# Patient Record
Sex: MALE | Race: WHITE | Employment: FULL TIME | URBAN - METROPOLITAN AREA
[De-identification: names, ages, dates, MRNs, and addresses within clinical notes are randomized per-mention and may not be internally consistent; named-entity substitution may affect disease eponyms.]

---

## 2017-05-11 ENCOUNTER — OFFICE VISIT (OUTPATIENT)
Dept: SLEEP MEDICINE | Age: 57
End: 2017-05-11

## 2017-05-11 VITALS
HEIGHT: 69 IN | DIASTOLIC BLOOD PRESSURE: 85 MMHG | OXYGEN SATURATION: 97 % | SYSTOLIC BLOOD PRESSURE: 143 MMHG | BODY MASS INDEX: 39.69 KG/M2 | HEART RATE: 73 BPM | WEIGHT: 268 LBS

## 2017-05-11 DIAGNOSIS — I10 ESSENTIAL HYPERTENSION: ICD-10-CM

## 2017-05-11 DIAGNOSIS — G47.33 OBSTRUCTIVE SLEEP APNEA (ADULT) (PEDIATRIC): Primary | ICD-10-CM

## 2017-05-11 NOTE — PROGRESS NOTES
217 [facility]., [place]. [place], [address]  Tel.  [phone]  Fax. 100 [facility] 60  [place], [address] [facility]  Tel.  [phone]  Fax. [phone] 3300 [facility] 3 [address]  Tel.  [phone]  Fax. [phone]     S>Gordon Bhatti is a 61 y.o. male seen for a positive airway pressure follow-up. He reports no problems using the device. The following problems are identified:    Drowsiness no Problems exhaling no   Snoring no Forget to put on no   Mask Comfortable yes Can't fall asleep no   Dry Mouth no Mask falls off no   Air Leaking no Frequent awakenings no     Download reviewed. He admits that his sleep has improved. Therapy Apnea Index averaged over PAP use: 1 /hr which reflects improved sleep breathing condition. No Known Allergies    He has a current medication list which includes the following prescription(s): naproxen sodium, citalopram, lisinopril, aspirin delayed-release, pravastatin, and docosahexanoic acid/epa. .      He  has a past medical history of Arthritis; Chronic pain; Hypertension; and Unspecified cerebral artery occlusion with cerebral infarction. Datto Sleepiness Score: 1   and Modified F.O.S.Q. Score Total / 2: 19.5   which reflect improved sleep quality over therapy time. O>    Visit Vitals    /85    Pulse 73    Ht 5' 9\" (1.753 m)    Wt 268 lb (121.6 kg)    SpO2 97%    BMI 39.58 kg/m2           General:   Alert, oriented, not in distress   Neck:   No JVD    Chest/Lungs:  symetrical lung expansion , no accessory muscle use    Extremities:  no obvious rashes , negative edema    Neuro:  No focal deficits ; No obvious tremor    Psych:  Normal affect ,  Normal countenance ;         A>    ICD-10-CM ICD-9-CM    1. Obstructive sleep apnea (adult) (pediatric) G47.33 327.23    2. Essential hypertension I10 401.9      AHI = 7(6-13). On CPAP :  5-15 cmH2O.     Compliant:      yes    Therapeutic Response:  Positive    P>      * Follow-up Disposition:  Return in about 1 year (around 5/11/2018). he will continue on his current pressure settings. I have ordered replacement supplies    I have counseled the patient regarding the benefits of weight loss. * He was asked to contact our office for any problems regarding PAP therapy. * Counseling was provided regarding the importance of regular PAP use and on proper sleep hygiene and safe driving. * Re-enforced proper and regular cleaning for the device. 2. Hypertension - he continues on his current regimen. I have reviewed the relationship between hypertension as it relates to sleep-disordered breathing.      Governor MD Carolann  Diplomate in Sleep Medicine  [facility]

## 2017-05-11 NOTE — PATIENT INSTRUCTIONS
217 [facility]., [place]. [place], [address]  Tel.  [phone]  Fax. 100 [facility] 60  [place], [address]  Tel.  [phone]  Fax. [phone] [address]  Tel.  [phone]  Fax. [phone]     PROPER SLEEP HYGIENE    What to avoid  · Do not have drinks with caffeine, such as coffee or black tea, for 8 hours before bed. · Do not smoke or use other types of tobacco near bedtime. Nicotine is a stimulant and can keep you awake. · Avoid drinking alcohol late in the evening, because it can cause you to wake in the middle of the night. · Do not eat a big meal close to bedtime. If you are hungry, eat a light snack. · Do not drink a lot of water close to bedtime, because the need to urinate may wake you up during the night. · Do not read or watch TV in bed. Use the bed only for sleeping and sexual activity. What to try  · Go to bed at the same time every night, and wake up at the same time every morning. Do not take naps during the day. · Keep your bedroom quiet, dark, and cool. · Get regular exercise, but not within 3 to 4 hours of your bedtime. .  · Sleep on a comfortable pillow and mattress. · If watching the clock makes you anxious, turn it facing away from you so you cannot see the time. · If you worry when you lie down, start a worry book. Well before bedtime, write down your worries, and then set the book and your concerns aside. · Try meditation or other relaxation techniques before you go to bed. · If you cannot fall asleep, get up and go to another room until you feel sleepy. Do something relaxing. Repeat your bedtime routine before you go to bed again. · Make your house quiet and calm about an hour before bedtime. Turn down the lights, turn off the TV, log off the computer, and turn down the volume on music. This can help you relax after a busy day.     Drowsy Driving  The [address] [place] [address] Road Traffic Safety Administration cites drowsiness as a causing factor in more than 202,339 police reported crashes annually, resulting in 76,000 injuries and 1,500 deaths. Other surveys suggest 55% of people polled have driven while drowsy in the past year, 23% had fallen asleep but not crashed, 3% crashed, and 2% had and accident due to drowsy driving. Who is at risk? Young Drivers: One study of drowsy driving accidents states that 55% of the drivers were under 25 years. Of those, 75% were male. Shift Workers and Travelers: People who work overnight or travel across time zones frequently are at higher risk of experiencing Circadian Rhythm Disorders. They are trying to work and function when their body is programed to sleep. Sleep Deprived: Lack of sleep has a serious impact on your ability to pay attention or focus on a task. Consistently getting less than the average of 8 hours your body needs creates partial or cumulative sleep deprivation. Untreated Sleep Disorders: Sleep Apnea, Narcolepsy, R.L.S., and other sleep disorders (untreated) prevent a person from getting enough restful sleep. This leads to excessive daytime sleepiness and increases the risk for drowsy driving accidents by up to 7 times. Medications / Alcohol: Even over the counter medications can cause drowsiness. Medications that impair a drivers attention should have a warning label. Alcohol naturally makes you sleepy and on its own can cause accidents. Combined with excessive drowsiness its effects are amplified. Signs of Drowsy Driving:   * You don't remember driving the last few miles   * You may drift out of your cherrie   * You are unable to focus and your thoughts wander   * You may yawn more often than normal   * You have difficulty keeping your eyes open / nodding off   * Missing traffic signs, speeding, or tailgating  Prevention-   Good sleep hygiene, lifestyle and behavioral choices have the most impact on drowsy driving.  There is no substitute for sleep and the average person requires 8 hours nightly. If you find yourself driving drowsy, stop and sleep. Consider the sleep hygiene tips provided during your visit as well. Medication Refill Policy: Refills for all medications require 1 week advance notice. Please have your pharmacy fax a refill request. We are unable to fax, or call in \"controled substance\" medications and you will need to pick these prescriptions up from our office. Blogvio Activation    Thank you for requesting access to Blogvio. Please follow the instructions below to securely access and download your online medical record. Blogvio allows you to send messages to your doctor, view your test results, renew your prescriptions, schedule appointments, and more. How Do I Sign Up? 1. In your internet browser, go to [email or web address]. 2. Click on the First Time User? Click Here link in the Sign In box. You will see the New Member Sign Up page. 3. Enter your Blogvio Access Code exactly as it appears below. You will not need to use this code after youve completed the sign-up process. If you do not sign up before the expiration date, you must request a new code. Blogvio Access Code: [email or web address]  Expires: 2017  9:03 AM (This is the date your Blogvio access code will )    4. Enter the last four digits of your Social Security Number (xxxx) and Date of Birth (mm/dd/yyyy) as indicated and click Submit. You will be taken to the next sign-up page. 5. Create a Blogvio ID. This will be your Blogvio login ID and cannot be changed, so think of one that is secure and easy to remember. 6. Create a Blogvio password. You can change your password at any time. 7. Enter your Password Reset Question and Answer. This can be used at a later time if you forget your password. 8. Enter your e-mail address. You will receive e-mail notification when new information is available in [address] 9. Click Sign Up.  You can now view and download portions of your medical record. 10. Click the Download Summary menu link to download a portable copy of your medical information. Additional Information    If you have questions, please call [phone]. Remember, [employer] is NOT to be used for urgent needs. For medical emergencies, dial 911.

## 2017-05-12 ENCOUNTER — DOCUMENTATION ONLY (OUTPATIENT)
Dept: SLEEP MEDICINE | Age: 57
End: 2017-05-12

## 2018-04-12 ENCOUNTER — DOCUMENTATION ONLY (OUTPATIENT)
Dept: SLEEP MEDICINE | Age: 58
End: 2018-04-12

## 2018-04-12 ENCOUNTER — OFFICE VISIT (OUTPATIENT)
Dept: SLEEP MEDICINE | Age: 58
End: 2018-04-12

## 2018-04-12 VITALS
OXYGEN SATURATION: 95 % | BODY MASS INDEX: 39.55 KG/M2 | HEART RATE: 65 BPM | SYSTOLIC BLOOD PRESSURE: 147 MMHG | WEIGHT: 267 LBS | DIASTOLIC BLOOD PRESSURE: 73 MMHG | HEIGHT: 69 IN

## 2018-04-12 DIAGNOSIS — I10 ESSENTIAL HYPERTENSION: ICD-10-CM

## 2018-04-12 DIAGNOSIS — E66.01 SEVERE OBESITY (BMI 35.0-39.9) WITH COMORBIDITY (HCC): ICD-10-CM

## 2018-04-12 DIAGNOSIS — G47.33 OBSTRUCTIVE SLEEP APNEA (ADULT) (PEDIATRIC): Primary | ICD-10-CM

## 2018-04-12 NOTE — PROGRESS NOTES
217 [facility]., [place]. [place], [address]  Tel.  [phone]  Fax. 100 [facility] 60  [place], [address]  Tel.  [phone]  Fax. [phone] [address]  Tel.  [phone]  Fax. [phone]     S>Gordon Desai is a 61 y.o. male seen for a positive airway pressure follow-up. He reports no problems using the device. The following problems are identified:    Drowsiness no Problems exhaling no   Snoring no Forget to put on no   Mask Comfortable yes Can't fall asleep no   Dry Mouth no Mask falls off no   Air Leaking no Frequent awakenings no       He admits that his sleep has improved. Therapy Apnea Index averaged over PAP use: 1 /hr which reflects improved sleep breathing condition. No Known Allergies    He has a current medication list which includes the following prescription(s): naproxen sodium, citalopram, lisinopril, aspirin delayed-release, pravastatin, and docosahexanoic acid/epa. .      He  has a past medical history of Arthritis; Chronic pain; Hypertension; and Unspecified cerebral artery occlusion with cerebral infarction. Trilla Sleepiness Score: 1   and Modified F.O.S.Q. Score Total / 2: 19.5   which reflect improved sleep quality over therapy time. O>    Visit Vitals    /73    Pulse 65    Ht 5' 9\" (1.753 m)    Wt 267 lb (121.1 kg)    SpO2 95%    BMI 39.43 kg/m2           General:   Alert, oriented, not in distress   Neck:   No JVD    Chest/Lungs:  symetrical lung expansion , no accessory muscle use    Extremities:  no obvious rashes , negative edema    Neuro:  No focal deficits ; No obvious tremor    Psych:  Normal affect ,  Normal countenance ;         A>    ICD-10-CM ICD-9-CM    1. Obstructive sleep apnea (adult) (pediatric) G47.33 327.23 AMB SUPPLY ORDER   2. Essential hypertension I10 401.9    3. Severe obesity (BMI 35.0-39. 9) with comorbidity ([facility] E66.01 278.01      AHI = 29(2009).   On CPAP : 5-15 cmH2O. Compliant:      yes    Therapeutic Response:  Positive    P>      * Follow-up Disposition:  Return in about 1 year (around 4/12/2019). he will continue on his current pressure settings. 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 * Counseling was provided regarding the importance of regular PAP use and on proper sleep hygiene and safe driving. * Re-enforced proper and regular cleaning for the device. 2. Hypertension - he continues on his current regimen. I have reviewed the relationship between hypertension as it relates to sleep-disordered breathing. 3. Obesity - I have counseled the patient regarding the benefits of weight loss.     Hamida Sevilla MD  Diplomate in Sleep Medicine  [facility]

## 2018-04-12 NOTE — PATIENT INSTRUCTIONS
217 [facility] [place]. [place], [address]  Tel.  [phone]  Fax. 100 [facility] 60  [place], [address]  Tel.  [phone]  Fax. [phone] [address]  Tel.  [phone]  Fax. [phone]     PROPER SLEEP HYGIENE    What to avoid  · Do not have drinks with caffeine, such as coffee or black tea, for 8 hours before bed. · Do not smoke or use other types of tobacco near bedtime. Nicotine is a stimulant and can keep you awake. · Avoid drinking alcohol late in the evening, because it can cause you to wake in the middle of the night. · Do not eat a big meal close to bedtime. If you are hungry, eat a light snack. · Do not drink a lot of water close to bedtime, because the need to urinate may wake you up during the night. · Do not read or watch TV in bed. Use the bed only for sleeping and sexual activity. What to try  · Go to bed at the same time every night, and wake up at the same time every morning. Do not take naps during the day. · Keep your bedroom quiet, dark, and cool. · Get regular exercise, but not within 3 to 4 hours of your bedtime. .  · Sleep on a comfortable pillow and mattress. · If watching the clock makes you anxious, turn it facing away from you so you cannot see the time. · If you worry when you lie down, start a worry book. Well before bedtime, write down your worries, and then set the book and your concerns aside. · Try meditation or other relaxation techniques before you go to bed. · If you cannot fall asleep, get up and go to another room until you feel sleepy. Do something relaxing. Repeat your bedtime routine before you go to bed again. · Make your house quiet and calm about an hour before bedtime. Turn down the lights, turn off the TV, log off the computer, and turn down the volume on music. This can help you relax after a busy day.     Drowsy Driving  The [address] [place] [address] Road Traffic Safety Administration cites drowsiness as a causing factor in more than 291,128 police reported crashes annually, resulting in 76,000 injuries and 1,500 deaths. Other surveys suggest 55% of people polled have driven while drowsy in the past year, 23% had fallen asleep but not crashed, 3% crashed, and 2% had and accident due to drowsy driving. Who is at risk? Young Drivers: One study of drowsy driving accidents states that 55% of the drivers were under 25 years. Of those, 75% were male. Shift Workers and Travelers: People who work overnight or travel across time zones frequently are at higher risk of experiencing Circadian Rhythm Disorders. They are trying to work and function when their body is programed to sleep. Sleep Deprived: Lack of sleep has a serious impact on your ability to pay attention or focus on a task. Consistently getting less than the average of 8 hours your body needs creates partial or cumulative sleep deprivation. Untreated Sleep Disorders: Sleep Apnea, Narcolepsy, R.L.S., and other sleep disorders (untreated) prevent a person from getting enough restful sleep. This leads to excessive daytime sleepiness and increases the risk for drowsy driving accidents by up to 7 times. Medications / Alcohol: Even over the counter medications can cause drowsiness. Medications that impair a drivers attention should have a warning label. Alcohol naturally makes you sleepy and on its own can cause accidents. Combined with excessive drowsiness its effects are amplified. Signs of Drowsy Driving:   * You don't remember driving the last few miles   * You may drift out of your cherrie   * You are unable to focus and your thoughts wander   * You may yawn more often than normal   * You have difficulty keeping your eyes open / nodding off   * Missing traffic signs, speeding, or tailgating  Prevention-   Good sleep hygiene, lifestyle and behavioral choices have the most impact on drowsy driving.  There is no substitute for sleep and the average person requires 8 hours nightly. If you find yourself driving drowsy, stop and sleep. Consider the sleep hygiene tips provided during your visit as well. Medication Refill Policy: Refills for all medications require 1 week advance notice. Please have your pharmacy fax a refill request. We are unable to fax, or call in \"controled substance\" medications and you will need to pick these prescriptions up from our office. discoapi Activation    Thank you for requesting access to discoapi. Please follow the instructions below to securely access and download your online medical record. discoapi allows you to send messages to your doctor, view your test results, renew your prescriptions, schedule appointments, and more. How Do I Sign Up? 1. In your internet browser, go to [email or web address] 2. Click on the First Time User? Click Here link in the Sign In box. You will see the New Member Sign Up page. 3. Enter your discoapi Access Code exactly as it appears below. You will not need to use this code after youve completed the sign-up process. If you do not sign up before the expiration date, you must request a new code. discoapi Access Code: [email or web address]  Expires: 2018  8:48 AM (This is the date your discoapi access code will )    4. Enter the last four digits of your Social Security Number (xxxx) and Date of Birth (mm/dd/yyyy) as indicated and click Submit. You will be taken to the next sign-up page. 5. Create a discoapi ID. This will be your discoapi login ID and cannot be changed, so think of one that is secure and easy to remember. 6. Create a discoapi password. You can change your password at any time. 7. Enter your Password Reset Question and Answer. This can be used at a later time if you forget your password. 8. Enter your e-mail address. You will receive e-mail notification when new information is available in [address] 9. Click Sign Up.  You can now view and download portions of your medical record. 10. Click the Download Summary menu link to download a portable copy of your medical information. Additional Information    If you have questions, please call [phone]. Remember, [employer] is NOT to be used for urgent needs. For medical emergencies, dial 911.

## 2019-10-09 ENCOUNTER — HOSPITAL ENCOUNTER (EMERGENCY)
Age: 59
Discharge: HOME | End: 2019-10-09
Attending: EMERGENCY MEDICINE | Admitting: EMERGENCY MEDICINE
Payer: COMMERCIAL

## 2019-10-09 ENCOUNTER — APPOINTMENT (OUTPATIENT)
Dept: GENERAL RADIOLOGY | Age: 59
End: 2019-10-09
Attending: EMERGENCY MEDICINE
Payer: COMMERCIAL

## 2019-10-09 VITALS
WEIGHT: 273.81 LBS | SYSTOLIC BLOOD PRESSURE: 154 MMHG | TEMPERATURE: 97.7 F | OXYGEN SATURATION: 96 % | DIASTOLIC BLOOD PRESSURE: 97 MMHG | BODY MASS INDEX: 40.56 KG/M2 | HEART RATE: 72 BPM | HEIGHT: 69 IN

## 2019-10-09 DIAGNOSIS — R07.9 CHEST PAIN, UNSPECIFIED TYPE: Primary | ICD-10-CM

## 2019-10-09 LAB
ALBUMIN SERPL-MCNC: 4.1 G/DL (ref 3.5–5)
ALBUMIN/GLOB SERPL: 1.1 {RATIO} (ref 1.1–2.2)
ALP SERPL-CCNC: 72 U/L (ref 45–117)
ALT SERPL-CCNC: 62 U/L (ref 12–78)
ANION GAP SERPL CALC-SCNC: 5 MMOL/L (ref 5–15)
AST SERPL-CCNC: 54 U/L (ref 15–37)
BASOPHILS # BLD: 0 K/UL (ref 0–0.1)
BASOPHILS NFR BLD: 1 % (ref 0–1)
BILIRUB SERPL-MCNC: 0.9 MG/DL (ref 0.2–1)
BUN SERPL-MCNC: 16 MG/DL (ref 6–20)
BUN/CREAT SERPL: 15 (ref 12–20)
CALCIUM SERPL-MCNC: 9.3 MG/DL (ref 8.5–10.1)
CHLORIDE SERPL-SCNC: 102 MMOL/L (ref 97–108)
CK SERPL-CCNC: 272 U/L (ref 39–308)
CO2 SERPL-SCNC: 32 MMOL/L (ref 21–32)
CREAT SERPL-MCNC: 1.08 MG/DL (ref 0.7–1.3)
DIFFERENTIAL METHOD BLD: NORMAL
EOSINOPHIL # BLD: 0.1 K/UL (ref 0–0.4)
EOSINOPHIL NFR BLD: 2 % (ref 0–7)
ERYTHROCYTE [DISTWIDTH] IN BLOOD BY AUTOMATED COUNT: 13.4 % (ref 11.5–14.5)
GLOBULIN SER CALC-MCNC: 3.6 G/DL (ref 2–4)
GLUCOSE SERPL-MCNC: 128 MG/DL (ref 65–100)
HCT VFR BLD AUTO: 45.4 % (ref 36.6–50.3)
HGB BLD-MCNC: 15.4 G/DL (ref 12.1–17)
IMM GRANULOCYTES # BLD AUTO: 0 K/UL (ref 0–0.04)
IMM GRANULOCYTES NFR BLD AUTO: 0 % (ref 0–0.5)
LYMPHOCYTES # BLD: 1.6 K/UL (ref 0.8–3.5)
LYMPHOCYTES NFR BLD: 28 % (ref 12–49)
MCH RBC QN AUTO: 30.7 PG (ref 26–34)
MCHC RBC AUTO-ENTMCNC: 33.9 G/DL (ref 30–36.5)
MCV RBC AUTO: 90.4 FL (ref 80–99)
MONOCYTES # BLD: 0.7 K/UL (ref 0–1)
MONOCYTES NFR BLD: 12 % (ref 5–13)
NEUTS SEG # BLD: 3.4 K/UL (ref 1.8–8)
NEUTS SEG NFR BLD: 57 % (ref 32–75)
NRBC # BLD: 0 K/UL (ref 0–0.01)
NRBC BLD-RTO: 0 PER 100 WBC
PLATELET # BLD AUTO: 159 K/UL (ref 150–400)
PMV BLD AUTO: 10.8 FL (ref 8.9–12.9)
POTASSIUM SERPL-SCNC: 3.6 MMOL/L (ref 3.5–5.1)
PROT SERPL-MCNC: 7.7 G/DL (ref 6.4–8.2)
RBC # BLD AUTO: 5.02 M/UL (ref 4.1–5.7)
SODIUM SERPL-SCNC: 139 MMOL/L (ref 136–145)
TROPONIN I SERPL-MCNC: <0.05 NG/ML
TROPONIN I SERPL-MCNC: <0.05 NG/ML
WBC # BLD AUTO: 5.9 K/UL (ref 4.1–11.1)

## 2019-10-09 PROCEDURE — 36415 COLL VENOUS BLD VENIPUNCTURE: CPT

## 2019-10-09 PROCEDURE — 74011250637 HC RX REV CODE- 250/637: Performed by: EMERGENCY MEDICINE

## 2019-10-09 PROCEDURE — 93005 ELECTROCARDIOGRAM TRACING: CPT

## 2019-10-09 PROCEDURE — 84484 ASSAY OF TROPONIN QUANT: CPT

## 2019-10-09 PROCEDURE — 99285 EMERGENCY DEPT VISIT HI MDM: CPT

## 2019-10-09 PROCEDURE — 80053 COMPREHEN METABOLIC PANEL: CPT

## 2019-10-09 PROCEDURE — 85025 COMPLETE CBC W/AUTO DIFF WBC: CPT

## 2019-10-09 PROCEDURE — 71045 X-RAY EXAM CHEST 1 VIEW: CPT

## 2019-10-09 PROCEDURE — 82550 ASSAY OF CK (CPK): CPT

## 2019-10-09 RX ORDER — ASPIRIN 325 MG
325 TABLET ORAL ONCE
Status: COMPLETED | OUTPATIENT
Start: 2019-10-09 | End: 2019-10-09

## 2019-10-09 RX ADMIN — ASPIRIN 325 MG: 325 TABLET, FILM COATED ORAL at 15:26

## 2019-10-09 NOTE — DISCHARGE INSTRUCTIONS

## 2019-10-09 NOTE — ED NOTES
Dr. Genesis Arce reviewed discharge instructions with the patient. The patient verbalized understanding. All questions and concerns were addressed. The patient declined a wheelchair and is discharged ambulatory in the care of family members with instructions and prescriptions in hand. Pt is alert and oriented x 4. Respirations are clear and unlabored.

## 2019-10-10 LAB
ATRIAL RATE: 64 BPM
CALCULATED P AXIS, ECG09: 58 DEGREES
CALCULATED R AXIS, ECG10: 38 DEGREES
CALCULATED T AXIS, ECG11: 19 DEGREES
DIAGNOSIS, 93000: NORMAL
P-R INTERVAL, ECG05: 168 MS
Q-T INTERVAL, ECG07: 424 MS
QRS DURATION, ECG06: 122 MS
QTC CALCULATION (BEZET), ECG08: 437 MS
VENTRICULAR RATE, ECG03: 64 BPM

## 2019-10-10 NOTE — ED PROVIDER NOTES
EMERGENCY DEPARTMENT HISTORY AND PHYSICAL EXAM      Date: 10/9/2019  Patient Name: Magi Nguyen    History of Presenting Illness     Chief Complaint   Patient presents with    Chest Pain     Started last night, worse this morning. Pt denies cardiac hx. Pt reports SOB at baseline. History Provided By: Patient    HPI: Magi Nguyen, 60 y.o. male with PMHx as noted below presents the emergency department with chief complaint of chest pain. Patient notes that he had onset of left-sided chest pain last night that had resolved and then returned again this morning. Pain is been constant since waking this morning and is described as a left-sided dull ache that radiates into his left shoulder. Patient also reports intermittent tingling in his left fingers although notes some residual deficit in that arm from a prior stroke. Patient does note since arriving to the emergency department the pain has somewhat improved. Otherwise pain does not seem to be exertional he has no shortness of breath, diaphoresis, nausea or vomiting. Denies any cardiac history. Patient's risk factors include high blood pressure and past smoking history. PCP: FARIDEH Chisholm    Current Outpatient Medications   Medication Sig Dispense Refill    naproxen sodium (ALEVE) 220 mg tablet Take 220 mg by mouth two (2) times daily (with meals).  citalopram (CELEXA) 20 mg tablet Take 20 mg by mouth daily.  lisinopril (PRINIVIL, ZESTRIL) 5 mg tablet Take  by mouth daily.  aspirin delayed-release 81 mg tablet Take 2 Tabs by mouth daily. 100 Tab 0    pravastatin (PRAVACHOL) 40 mg tablet Take 1 Tab by mouth nightly. 30 Tab 1    DOCOSAHEXANOIC ACID/EPA (FISH OIL PO) Take 1,400 mg by mouth two (2) times a day.          Past History     Past Medical History:  Past Medical History:   Diagnosis Date    Arthritis     back, knees and hips    Chronic pain     back and legs    Hypertension     Unspecified cerebral artery occlusion with cerebral infarction        Past Surgical History:  Past Surgical History:   Procedure Laterality Date    HX HERNIA REPAIR      HX ORTHOPAEDIC      left leg metal katia       Family History:  Family History   Problem Relation Age of Onset    Diabetes Father     Cancer Father         Melanoma    Stroke Father     Stroke Maternal Grandmother        Social History:  Social History     Tobacco Use    Smoking status: Former Smoker     Packs/day: 2.00     Years: 30.00     Pack years: 60.00    Smokeless tobacco: Former User   Substance Use Topics    Alcohol use: Yes     Alcohol/week: 17.5 standard drinks     Types: 21 Cans of beer per week    Drug use: No       Allergies:  No Known Allergies      Review of Systems   Review of Systems  Constitutional: Negative for fever, chills, and fatigue. HENT: Negative for congestion, sore throat, rhinorrhea, sneezing and neck stiffness   Eyes: Negative for discharge and redness. Respiratory: Negative for  shortness of breath, wheezing   Cardiovascular:  positive for chest pain, negative palpitations   Gastrointestinal: Negative for nausea, vomiting, abdominal pain, constipation, diarrhea and blood in stool. Genitourinary: Negative for dysuria, hematuria, flank pain, decreased urine volume, discharge,   Musculoskeletal: Negative for myalgias or joint pain . Skin: Negative for rash or lesions . Neurological: Negative weakness, light-headedness, numbness and headaches. Physical Exam   Physical Exam    GENERAL: alert and oriented, no acute distress  EYES: PEERL, No injection, discharge or icterus. ENT: Mucous membranes pink and moist.  NECK: Supple  LUNGS: Airway patent. Non-labored respirations. Breath sounds clear with good air entry bilaterally. HEART: Regular rate and rhythm. No peripheral edema  ABDOMEN: Non-distended and non-tender, without guarding or rebound.   SKIN:  warm, dry  MSK/EXTREMITIES: Without swelling, tenderness or deformity, symmetric with normal ROM  NEUROLOGICAL: Alert, oriented, strength 5/5 bilateral upper and lower extremities, sensation intact and equal throughout to light touch      Diagnostic Study Results     Labs -  Reviewed    Radiologic Studies -   XR CHEST PORT   Final Result    impression: No acute changes. CT Results  (Last 48 hours)    None        CXR Results  (Last 48 hours)               10/09/19 1327  XR CHEST PORT Final result    Impression:   impression: No acute changes. Narrative:  Clinical indication: Chest pain. Portable AP semierect view of the chest is obtained, comparison to thousand 12. The a heart size is normal. There is no acute infiltrate. Medical Decision Making   I am the first provider for this patient. I reviewed the vital signs, available nursing notes, past medical history, past surgical history, family history and social history. Vital Signs-Reviewed the patient's vital signs. EKG interpretation: (Preliminary)  Rhythm: normal sinus rhythm; and regular . Rate (approx.): 60; Axis: normal; P wave: normal; QRS interval: normal ; ST/T wave: normal; when compared to previous no significant changes noted. Was interpreted by Chriss Platt MD,ED Provider. Records Reviewed: Nursing Notes and Old Medical Records     provider Notes (Medical Decision Making): On presentation, the patient is well appearing, in no acute distress with normal vital signs. Based on my history and exam the differential diagnosis for this patient includes,ACS, pulmonary embolism, aortic dissection musculoskeletal chest pain, GERD pneumothorax, pneumonia.,     Patient unlikely to have pulmonary embolism, no pleuritic chest pain, no tachycardia, hypoxia, tachypnea, no recent long immobilization with resolving symptoms so we will not pursue further work-up.   Additionally considered aortic dissection however    Patient appears comfortable with minimal symptoms and no neurologic deficits(other than his reported baseline from previous stroke) so will not obtain CTA of the chest at this time. I have re-examined the patient and the patient denies any new or changing symptoms. The patient has had 2 negative troponin at this time and an EKG without any signs of acute ischemia. He is otherwise low risk per HEART score. This   The diagnosis, follow up, return instructions, test esults, x-rays and medications have been discussed and reviewed with the patient. The patient has been given the opportunity to ask questions. The patient expresses understanding of the diagnosis, follow-up and return instructions. The patient agrees to follow up with cardiology as directed and to return immediately if the chest pain worsens. The patient expresses understanding that although cardiac testing at this time is negative, a cardiac problem could still be present and that a follow-up appointment with a cardiologist for further evaluation and risk factor modification is necessary to complete the evaluation of this complaint. Michela Patterson MD.        ED Course:   Initial assessment performed. The patients presenting problems have been discussed, and they are in agreement with the care plan formulated and outlined with them. I have encouraged them to ask questions as they arise throughout their visit. Medications   aspirin tablet 325 mg (325 mg Oral Given 10/9/19 1526)     HYPERTENSION COUNSELING:   Patient denies any current , headache, shortness of breath, lightheadedness, dizziness, or changes in vision. Patient is made aware of their elevated blood pressure and is instructed to follow up this week with their Primary Care for a recheck. Patient is counseled regarding consequences of chronic, uncontrolled hypertension including kidney disease, heart disease, stroke or even death. Patient verbally states their understanding.          PROGRESS NOTE:  The patient has been re-evaluated and is ready for discharge. Reviewed available results with patient and have counseled them on diagnosis and care plan. They have expressed understanding, and all their questions have been answered. They agree with plan and agree to have pt F/U as recommended, or return to the ED if their sxs worsen. Discharge instructions have been provided and explained to them, along with reasons to have pt return to the ED. The patient is amenable to discharge so will discharge pt at this time    Tori Mendoza MD        Disposition:  home    PLAN:  1. Discharge Medication List as of 10/9/2019  4:54 PM        2. Follow-up Information     Follow up With Specialties Details Why Contact Info    FARIDEH Holt Physician Assistant In 2 days  [place] [id number] [address] [place] [address] [facility] Schedule an appointment as soon as possible for a visit in 2 days  [address] [place] [address]  [phone]        Return to ED if worse     Diagnosis     Clinical Impression:   1.  Chest pain, unspecified type

## 2019-10-15 ENCOUNTER — OFFICE VISIT (OUTPATIENT)
Dept: CARDIOLOGY CLINIC | Age: 59
End: 2019-10-15

## 2019-10-15 VITALS
HEART RATE: 51 BPM | WEIGHT: 277 LBS | DIASTOLIC BLOOD PRESSURE: 88 MMHG | SYSTOLIC BLOOD PRESSURE: 140 MMHG | OXYGEN SATURATION: 97 % | HEIGHT: 69 IN | RESPIRATION RATE: 18 BRPM | BODY MASS INDEX: 41.03 KG/M2

## 2019-10-15 DIAGNOSIS — R07.9 CHEST PAIN, UNSPECIFIED TYPE: Primary | ICD-10-CM

## 2019-10-15 DIAGNOSIS — E78.5 DYSLIPIDEMIA: ICD-10-CM

## 2019-10-15 DIAGNOSIS — I10 ACCELERATED HYPERTENSION: ICD-10-CM

## 2019-10-15 DIAGNOSIS — E66.01 MORBID OBESITY DUE TO EXCESS CALORIES (HCC): ICD-10-CM

## 2019-10-15 DIAGNOSIS — R06.02 SOBOE (SHORTNESS OF BREATH ON EXERTION): ICD-10-CM

## 2019-10-15 DIAGNOSIS — M53.1 CERVICOBRACHIAL SYNDROME: ICD-10-CM

## 2019-10-15 RX ORDER — NEBIVOLOL HYDROCHLORIDE 10 MG/1
10 TABLET ORAL DAILY
COMMUNITY
Start: 2019-10-11 | End: 2022-04-12

## 2019-10-15 RX ORDER — LISINOPRIL AND HYDROCHLOROTHIAZIDE 12.5; 2 MG/1; MG/1
TABLET ORAL
Refills: 1 | COMMUNITY
Start: 2019-08-28

## 2019-10-15 RX ORDER — PREDNISONE 10 MG/1
TABLET ORAL
COMMUNITY
Start: 2019-10-11 | End: 2022-04-12

## 2019-10-15 NOTE — PROGRESS NOTES
1. Have you been to the ER, urgent care clinic since your last visit? Hospitalized since your last visit? SEEN ON 10/9/19.    2. Have you seen or consulted any other health care providers outside of the [address] [place] [address] since your last visit? Include any pap smears or colon screening. SEEN BY PCP.         Chief Complaint   Patient presents with    New Patient     LF ARM PAIN WITH FINGER NUMBNESS, CHEST PAIN, SOBOE

## 2019-10-15 NOTE — PROGRESS NOTES
[address] [place] [address] [address], [place], [address] [facility]     Matthieu Malhotra is a 60 y.o. male. First visit, referred for evaluation of chest pain and arm numbness. Subjective:     Matthieu Malhotra reports he has been having left arm discomfort with tingling fingers. He saw his PCP last week, who thinks the pain might be arthritic in nature. The patient reports that the discomfort interrupts his sleep. He reported to the ER after the pain spread into his chest during the work day. Patient denies any exertional chest pain, dyspnea, palpitations, syncope, orthopnea, edema or paroxysmal nocturnal dyspnea. Patient Active Problem List    Diagnosis Date Noted    Severe obesity (BMI 35.0-39. 9) with comorbidity ([facility] [address] 04/12/2018    RLS (restless legs syndrome) 07/16/2013    Late effect of stroke 07/16/2013    FLOR (obstructive sleep apnea) 07/16/2013    Cerebral thrombosis with cerebral infarction ([facility] [address] 02/13/2012    Accelerated hypertension 02/13/2012      Venetia Krabbe, PA  Past Medical History:   Diagnosis Date    Arthritis     back, knees and hips    Chronic pain     back and legs    Hypertension     Unspecified cerebral artery occlusion with cerebral infarction       Past Surgical History:   Procedure Laterality Date    HX HERNIA REPAIR      HX ORTHOPAEDIC      left leg metal katia     No Known Allergies   Family History   Problem Relation Age of Onset    Diabetes Father     Cancer Father         Melanoma    Stroke Father     Stroke Maternal Grandmother       Social History     Socioeconomic History    Marital status:      Spouse name: Not on file    Number of children: Not on file    Years of education: Not on file    Highest education level: Not on file   Occupational History    Not on file   Social Needs    Financial resource strain: Not on file    Food insecurity:     Worry: Not on file     Inability: Not on file    Transportation needs:     Medical: Not on file Non-medical: Not on file   Tobacco Use    Smoking status: Former Smoker     Packs/day: 2.00     Years: 30.00     Pack years: 60.00     Types: Cigarettes     Last attempt to quit: 10/15/2012     Years since quittin.0    Smokeless tobacco: Never Used   Substance and Sexual Activity    Alcohol use: Yes     Alcohol/week: 3.0 - 4.0 standard drinks     Types: 3 - 4 Cans of beer per week     Comment: MOST EVENINGS    Drug use: No    Sexual activity: Not on file   Lifestyle    Physical activity:     Days per week: Not on file     Minutes per session: Not on file    Stress: Not on file   Relationships    Social connections:     Talks on phone: Not on file     Gets together: Not on file     Attends Christianity service: Not on file     Active member of club or organization: Not on file     Attends meetings of clubs or organizations: Not on file     Relationship status: Not on file    Intimate partner violence:     Fear of current or ex partner: Not on file     Emotionally abused: Not on file     Physically abused: Not on file     Forced sexual activity: Not on file   Other Topics Concern    Not on file   Social History Narrative    Not on file      Current Outpatient Medications   Medication Sig    BYSTOLIC 10 mg tablet Take 10 mg by mouth daily.  predniSONE (STERAPRED DS) 10 mg dose pack     lisinopril-hydroCHLOROthiazide (PRINZIDE, ZESTORETIC) 20-12.5 mg per tablet TAKE 1 TABLET BY MOUTH EVERY DAY    naproxen sodium (ALEVE) 220 mg tablet Take 220 mg by mouth two (2) times daily (with meals). Indications: 2-4 DAILY    citalopram (CELEXA) 20 mg tablet Take 20 mg by mouth daily.  aspirin delayed-release 81 mg tablet Take 2 Tabs by mouth daily. (Patient taking differently: Take 81 mg by mouth daily.)    DOCOSAHEXANOIC ACID/EPA (FISH OIL PO) Take 1,400 mg by mouth two (2) times a day.  lisinopril (PRINIVIL, ZESTRIL) 5 mg tablet Take  by mouth daily.     pravastatin (PRAVACHOL) 40 mg tablet Take 1 Tab by mouth nightly. No current facility-administered medications for this visit. Review of Systems  Constitutional: Negative for fever, chills, malaise/fatigue and diaphoresis. Respiratory: Negative for cough, hemoptysis, sputum production, shortness of breath and wheezing. Cardiovascular: Negative for chest pain, palpitations, orthopnea, claudication, leg swelling and PND. +chest discomfort  Gastrointestinal: Negative for heartburn, nausea, vomiting, blood in stool and melena. Genitourinary: Negative for dysuria and flank pain. Musculoskeletal: Negative for joint pain and back pain. +left arm discomfort. Skin: Negative for rash. Neurological: Negative for focal weakness, seizures, loss of consciousness, weakness and headaches. Endo/Heme/Allergies: Does not bruise/bleed easily. Psychiatric/Behavioral: Negative for memory loss. The patient does not have insomnia. Physical Exam:    Visit Vitals  /88 (BP 1 Location: Right arm, BP Patient Position: Sitting)   Pulse (!) 51   Resp 18   Ht 5' 9\" (1.753 m)   Wt 277 lb (125.6 kg)   SpO2 97%   BMI 40.91 kg/m²     Wt Readings from Last 3 Encounters:   10/15/19 277 lb (125.6 kg)   10/09/19 273 lb 13 oz (124.2 kg)   04/12/18 267 lb (121.1 kg)       Gen: NAD    Mental Status - Alert. General Appearance - Not in acute distress. Neck - no JVD    Chest and Lung Exam   Inspection: Accessory muscles - No use of accessory muscles in breathing. Auscultation:   Breath sounds: - Normal.     Cardiovascular   Inspection: Jugular vein - Bilateral - Inspection Normal.   Palpation/Percussion:   Apical Impulse: - Normal.   Auscultation: Rhythm - Regular. Heart Sounds - S1 WNL and S2 WNL. No S3 or S4. Murmurs & Other Heart Sounds: Auscultation of the heart reveals - No Murmurs. Peripheral Vascular   Upper Extremity: Inspection - Bilateral - No Cyanotic nailbeds or Digital clubbing. Lower Extremity:   Palpation: Edema - Bilateral - No edema.     Abdomen: Soft, non-tender, bowel sounds are active. Neuro: A&O times 3, CN and motor grossly WNL    Cardiographics     Assessment:     Encounter Diagnoses   Name Primary?  Chest pain, unspecified type Yes    SOBOE (shortness of breath on exertion)     Accelerated hypertension     Dyslipidemia     Morbid obesity due to excess calories (HCC)     Cervicobrachial syndrome           Plan:     Mr. Sachi Mittal is new patient, reporting left arm discomfort. He was referred here for f/u from the ED when the pain radiated to his chest. His PCP started him on prednisone, as he believes the pain to be arthritic in origin. I do not believe this to cardiac in origin, either. Continue current treatment. His last LDL was 114.   No cardiac w/u necessary    Written by Courtney Cotton, as dictated by Donato Rush MD.

## 2019-10-15 NOTE — LETTER
10/18/19 Patient: Cydney Gonzales YOB: 1960 Date of Visit: 10/15/2019 Aristides Zimmer, [address] [address] VIA Facsimile: [phone] Dear FARIDEH Haney, Thank you for referring Mr. Elan Yadav to [facility] for evaluation. My notes for this consultation are attached. If you have questions, please do not hesitate to call me. I look forward to following your patient along with you.  
 
 
Sincerely, 
 
Brissa Dos Santos MD

## 2021-09-27 ENCOUNTER — TRANSCRIBE ORDER (OUTPATIENT)
Dept: SCHEDULING | Age: 61
End: 2021-09-27

## 2021-09-27 DIAGNOSIS — M79.669 PAIN IN UNSPECIFIED LOWER LEG: Primary | ICD-10-CM

## 2021-11-09 ENCOUNTER — HOSPITAL ENCOUNTER (OUTPATIENT)
Dept: VASCULAR SURGERY | Age: 61
Discharge: HOME | End: 2021-11-09
Attending: PHYSICIAN ASSISTANT
Payer: COMMERCIAL

## 2021-11-09 DIAGNOSIS — M79.669 PAIN IN UNSPECIFIED LOWER LEG: ICD-10-CM

## 2021-11-09 PROCEDURE — 93925 LOWER EXTREMITY STUDY: CPT

## 2021-11-10 LAB
LEFT CFA PROX SYS PSV: 132.6 CM/S
LEFT DIST ATA VELOCITY: 62.2 CM/S
LEFT DIST PTA PSV: 89.6 CM/S
LEFT MID ATA VELOCITY: 53.4 CM/S
LEFT PERONEAL DIST VELOCITY: 36 CM/S
LEFT PERONEAL MID SYS PSV: 40.4 CM/S
LEFT PERONEAL PROX SYS PSV: 40.4 CM/S
LEFT POP A PROX VEL RATIO: 1.06
LEFT POPLITEAL DIST SYS PSV: 39.8 CM/S
LEFT POPLITEAL PROX SYS PSV: 40.9 CM/S
LEFT PROX ATA VELOCITY: 63 CM/S
LEFT PROX PFA A PSV: 110.8 CM/S
LEFT PROX PTA PSV: 59.9 CM/S
LEFT PTA MID SYS PSV: 70.2 CM/S
LEFT SFA DIST VEL RATIO: 0.39
LEFT SFA MID VEL RATIO: 1.52
LEFT SFA PROX VEL RATIO: 0.49
LEFT SUPER FEMORAL DIST SYS PSV: 38.6 CM/S
LEFT SUPER FEMORAL MID SYS PSV: 98.7 CM/S
LEFT SUPER FEMORAL PROX SYS PSV: 65.1 CM/S
RIGHT CFA PROX SYS PSV: 170.2 CM/S
RIGHT DIST ATA VELOCITY: 62.2 CM/S
RIGHT DIST PTA PSV: 60.4 CM/S
RIGHT MID ATA VELOCITY: 102.2 CM/S
RIGHT PERONEAL DIST VELOCITY: 87 CM/S
RIGHT PERONEAL MID SYS PSV: 70.2 CM/S
RIGHT PERONEAL PROX SYS PSV: 78 CM/S
RIGHT POP A PROX VEL RATIO: 0.78
RIGHT POPLITEAL DIST SYS PSV: 61.3 CM/S
RIGHT POPLITEAL PROX SYS PSV: 43.8 CM/S
RIGHT PROX ATA VELOCITY: 65.1 CM/S
RIGHT PROX PFA A PSV: 156.2 CM/S
RIGHT PROX PTA PSV: 58.7 CM/S
RIGHT PTA MID SYS PSV: 44.7 CM/S
RIGHT SFA DIST VEL RATIO: 0.34
RIGHT SFA MID VEL RATIO: 2.4
RIGHT SFA PROX VEL RATIO: 0.4
RIGHT SUPER FEMORAL DIST SYS PSV: 56 CM/S
RIGHT SUPER FEMORAL MID SYS PSV: 165.5 CM/S
RIGHT SUPER FEMORAL PROX SYS PSV: 67.6 CM/S

## 2022-03-19 PROBLEM — E66.01 SEVERE OBESITY (BMI 35.0-39.9) WITH COMORBIDITY (HCC): Status: ACTIVE | Noted: 2018-04-12

## 2022-04-12 PROBLEM — E66.01 MORBID OBESITY (HCC): Status: ACTIVE | Noted: 2018-04-12

## 2022-04-12 PROBLEM — I73.9 PERIPHERAL VASCULAR DISEASE (HCC): Status: ACTIVE | Noted: 2022-04-12

## 2023-05-18 RX ORDER — CITALOPRAM 20 MG/1
20 TABLET ORAL DAILY
COMMUNITY

## 2023-05-18 RX ORDER — NAPROXEN SODIUM 220 MG
220 TABLET ORAL 2 TIMES DAILY WITH MEALS
COMMUNITY

## 2023-05-18 RX ORDER — LISINOPRIL AND HYDROCHLOROTHIAZIDE 20; 12.5 MG/1; MG/1
1 TABLET ORAL DAILY
COMMUNITY
Start: 2022-11-28

## 2023-05-18 RX ORDER — AMLODIPINE BESYLATE 5 MG/1
5 TABLET ORAL DAILY
COMMUNITY
Start: 2022-02-18

## 2023-05-18 RX ORDER — ASPIRIN 81 MG/1
162 TABLET ORAL DAILY
COMMUNITY
Start: 2012-02-16

## 2023-05-18 RX ORDER — ROSUVASTATIN CALCIUM 10 MG/1
1 TABLET, COATED ORAL NIGHTLY
COMMUNITY
Start: 2023-03-31

## 2025-03-13 ENCOUNTER — TRANSCRIBE ORDERS (OUTPATIENT)
Facility: HOSPITAL | Age: 65
End: 2025-03-13

## 2025-03-13 DIAGNOSIS — R91.8 MULTIPLE PULMONARY NODULES: Primary | ICD-10-CM

## 2025-07-12 ENCOUNTER — HOSPITAL ENCOUNTER (OUTPATIENT)
Facility: HOSPITAL | Age: 65
Discharge: HOME | End: 2025-07-15
Attending: INTERNAL MEDICINE
Payer: COMMERCIAL

## 2025-07-12 DIAGNOSIS — R91.8 MULTIPLE PULMONARY NODULES: ICD-10-CM

## 2025-07-12 PROCEDURE — 71250 CT THORAX DX C-: CPT

## 2025-07-21 ENCOUNTER — TRANSCRIBE ORDERS (OUTPATIENT)
Facility: HOSPITAL | Age: 65
End: 2025-07-21

## 2025-07-21 DIAGNOSIS — R91.8 MULTIPLE PULMONARY NODULES: Primary | ICD-10-CM
